# Patient Record
Sex: MALE | Race: WHITE | Employment: FULL TIME | URBAN - METROPOLITAN AREA
[De-identification: names, ages, dates, MRNs, and addresses within clinical notes are randomized per-mention and may not be internally consistent; named-entity substitution may affect disease eponyms.]

---

## 2017-06-30 ENCOUNTER — HOSPITAL ENCOUNTER (EMERGENCY)
Age: 47
Discharge: HOME | End: 2017-06-30
Attending: EMERGENCY MEDICINE
Payer: COMMERCIAL

## 2017-06-30 VITALS
RESPIRATION RATE: 18 BRPM | TEMPERATURE: 98.2 F | WEIGHT: 260 LBS | HEART RATE: 98 BPM | BODY MASS INDEX: 35.21 KG/M2 | DIASTOLIC BLOOD PRESSURE: 82 MMHG | OXYGEN SATURATION: 100 % | HEIGHT: 72 IN | SYSTOLIC BLOOD PRESSURE: 145 MMHG

## 2017-06-30 DIAGNOSIS — I82.4Y2 ACUTE DEEP VEIN THROMBOSIS (DVT) OF PROXIMAL VEIN OF LEFT LOWER EXTREMITY (HCC): Primary | ICD-10-CM

## 2017-06-30 LAB
ALBUMIN SERPL BCP-MCNC: 3.9 G/DL (ref 3.5–5)
ALBUMIN/GLOB SERPL: 1 {RATIO} (ref 1.1–2.2)
ALP SERPL-CCNC: 78 U/L (ref 45–117)
ALT SERPL-CCNC: 24 U/L (ref 12–78)
ANION GAP BLD CALC-SCNC: 8 MMOL/L (ref 5–15)
AST SERPL W P-5'-P-CCNC: 22 U/L (ref 15–37)
BASOPHILS # BLD AUTO: 0 K/UL (ref 0–0.1)
BASOPHILS # BLD: 0 % (ref 0–1)
BILIRUB SERPL-MCNC: 0.3 MG/DL (ref 0.2–1)
BUN SERPL-MCNC: 15 MG/DL (ref 6–20)
BUN/CREAT SERPL: 15 (ref 12–20)
CALCIUM SERPL-MCNC: 9 MG/DL (ref 8.5–10.1)
CHLORIDE SERPL-SCNC: 103 MMOL/L (ref 97–108)
CO2 SERPL-SCNC: 28 MMOL/L (ref 21–32)
CREAT SERPL-MCNC: 0.97 MG/DL (ref 0.7–1.3)
EOSINOPHIL # BLD: 0.4 K/UL (ref 0–0.4)
EOSINOPHIL NFR BLD: 3 % (ref 0–7)
ERYTHROCYTE [DISTWIDTH] IN BLOOD BY AUTOMATED COUNT: 12.3 % (ref 11.5–14.5)
GLOBULIN SER CALC-MCNC: 4.1 G/DL (ref 2–4)
GLUCOSE SERPL-MCNC: 94 MG/DL (ref 65–100)
HCT VFR BLD AUTO: 39.5 % (ref 36.6–50.3)
HGB BLD-MCNC: 14 G/DL (ref 12.1–17)
LYMPHOCYTES # BLD AUTO: 22 % (ref 12–49)
LYMPHOCYTES # BLD: 2.7 K/UL (ref 0.8–3.5)
MCH RBC QN AUTO: 31.2 PG (ref 26–34)
MCHC RBC AUTO-ENTMCNC: 35.4 G/DL (ref 30–36.5)
MCV RBC AUTO: 88 FL (ref 80–99)
MONOCYTES # BLD: 0.7 K/UL (ref 0–1)
MONOCYTES NFR BLD AUTO: 6 % (ref 5–13)
NEUTS SEG # BLD: 8.1 K/UL (ref 1.8–8)
NEUTS SEG NFR BLD AUTO: 69 % (ref 32–75)
PLATELET # BLD AUTO: 177 K/UL (ref 150–400)
POTASSIUM SERPL-SCNC: 4 MMOL/L (ref 3.5–5.1)
PROT SERPL-MCNC: 8 G/DL (ref 6.4–8.2)
RBC # BLD AUTO: 4.49 M/UL (ref 4.1–5.7)
SODIUM SERPL-SCNC: 139 MMOL/L (ref 136–145)
WBC # BLD AUTO: 11.9 K/UL (ref 4.1–11.1)

## 2017-06-30 PROCEDURE — 93971 EXTREMITY STUDY: CPT

## 2017-06-30 PROCEDURE — 80053 COMPREHEN METABOLIC PANEL: CPT | Performed by: EMERGENCY MEDICINE

## 2017-06-30 PROCEDURE — 99283 EMERGENCY DEPT VISIT LOW MDM: CPT

## 2017-06-30 PROCEDURE — 85025 COMPLETE CBC W/AUTO DIFF WBC: CPT | Performed by: EMERGENCY MEDICINE

## 2017-06-30 PROCEDURE — 74011250637 HC RX REV CODE- 250/637: Performed by: EMERGENCY MEDICINE

## 2017-06-30 RX ORDER — IBUPROFEN 200 MG
400 TABLET ORAL
COMMUNITY

## 2017-06-30 RX ADMIN — RIVAROXABAN 15 MG: 15 TABLET, FILM COATED ORAL at 19:10

## 2017-06-30 NOTE — ED PROVIDER NOTES
HPI Comments: 41 y.o. male with no significant past medical history who presents from work with chief complaint of leg swelling. Per pt, he has experiencing increased LLE swelling over the past three days (since 6/27/2017) with associated tightness. He states that his tightness has been significant to his posterior left patella as well as to his posterior calf. The pt rates his pain 4/10. He makes it known that he has past hx of operation to tendon to his left knee which involved placement of two pins (occured in 1991). Since, he notes that he has not received any further operations. Per pt, he reports receiving x-ray imaging of his LLE within the past five years which showed evidence of degenerative arthritis. The pt states he was seen at his [facility] today (6/30/2017) where the diameter of both his LE was taken. He reports that he was found to have a diameter of 48 cm to his left LE while having a diameter of 44 cm to his right. The pt notes he was suggested to be seen in the ED by the physician to receive an ultrasound to rule out possible DVT. Per pt, he has no hx or family hx of blood clots in the past. He denies being on any recent long distant car rides. The pt further denies fever, chills, N/V/D, CP, SOB, abd pain and urinary symptoms. There are no other acute medical concerns at this time. Social hx: Current smoker, No known ETOH use  PCP: No primary care provider on file. Note written by Navneet Borrego, as dictated by Deanne Handy MD 5:09 PM          The history is provided by the patient. No past medical history on file. No past surgical history on file. No family history on file. Social History     Social History    Marital status: N/A     Spouse name: N/A    Number of children: N/A    Years of education: N/A     Occupational History    Not on file.      Social History Main Topics    Smoking status: Not on file    Smokeless tobacco: Not on file    Alcohol use Not on file    Drug use: Not on file    Sexual activity: Not on file     Other Topics Concern    Not on file     Social History Narrative         ALLERGIES: Review of patient's allergies indicates no known allergies. Review of Systems   Constitutional: Negative for chills and fever. HENT: Negative for rhinorrhea and sore throat. Respiratory: Negative for cough and shortness of breath. Cardiovascular: Positive for leg swelling (increase LLE swelling ongoing for the past three days (since 6/27/2017)). Negative for chest pain. Gastrointestinal: Negative for abdominal pain, diarrhea, nausea and vomiting. Genitourinary: Negative for dysuria, frequency and hematuria. Musculoskeletal: Positive for myalgias (tight sensation to posterior patella and calf of LLE which is associated with LLE swelling). Negative for arthralgias. Skin: Negative for pallor and rash. Neurological: Negative for dizziness, weakness and light-headedness. All other systems reviewed and are negative. Vitals:    06/30/17 1619   BP: 145/82   Pulse: 98   Resp: 18   Temp: 98.2 °F (36.8 °C)   SpO2: 100%   Weight: 117.9 kg (260 lb)   Height: 6' (1.829 m)            Physical Exam   Const:  No acute distress, well developed, well nourished  Head:  Atraumatic, normocephalic  Eyes:  PERRL, conjunctiva normal, no scleral icterus  Neck:  Supple, trachea midline  Cardiovascular:  RRR, no murmurs, no gallops, no rubs  Resp:  No resp distress, no increased work of breathing, no wheezes, no rhonchi, no rales,  Abd:  Soft, non-tender, non-distended, no rebound, no guarding, no CVA tenderness  :  Deferred  MSK:  No pedal edema, normal ROM. ( Moderate swelling to left calf and knee).   Neuro:  Alert and oriented x3, no cranial nerve defect  Skin:  Warm, dry, intact  Psych: normal mood and affect, behavior is normal, judgement and thought content is normal    Note written by Navneet Stafford, as dictated by Candace Singh Martha Carreno MD 5:09 PM    MDM  Number of Diagnoses or Management Options  Acute deep vein thrombosis (DVT) of proximal vein of left lower extremity [facility]):      Amount and/or Complexity of Data Reviewed  Clinical lab tests: ordered and reviewed  Tests in the radiology section of CPT®: ordered and reviewed  Review and summarize past medical records: yes    Patient Progress  Patient progress: stable    ED Course     Pt. Presents to the ER with left leg swelling. Pt. Found to have a DVT. No obvious risk factors. No signs/sx of PE. I will start her on Xarelto. Pt.  To be evaluated for admission by the hospitalist.      Procedures

## 2017-06-30 NOTE — ED NOTES
Patient discharged by provider with returned verbal understanding to RN, patient ambulated out with steady gait

## 2017-06-30 NOTE — DISCHARGE INSTRUCTIONS
We hope that we have addressed all of your medical concerns. The examination and treatment you received in the Emergency Department were for an emergent problem and were not intended as complete care. It is important that you follow up with your healthcare provider(s) for ongoing care. If your symptoms worsen or do not improve as expected, and you are unable to reach your usual health care provider(s), you should return to the Emergency Department. Today's healthcare is undergoing tremendous change, and patient satisfaction surveys are one of the many tools to assess the quality of medical care. You may receive a survey from the [employer] regarding your experience in the Emergency Department. I hope that your experience has been completely positive, particularly the medical care that I provided. As such, please participate in the survey; anything less than excellent does not meet my expectations or intentions. [facility] and [address] [place] [address] participate in nationally recognized quality of care measures. If your blood pressure is greater than 120/80, as reported below, we urge that you seek medical care to address the potential of high blood pressure, commonly known as hypertension. Hypertension can be hereditary or can be caused by certain medical conditions, pain, stress, or \"white coat syndrome. \"       Please make an appointment with your health care provider(s) for follow up of your Emergency Department visit. VITALS:   Patient Vitals for the past 8 hrs:   Temp Pulse Resp BP SpO2   06/30/17 1619 98.2 °F (36.8 °C) 98 18 145/82 100 %          Thank you for allowing us to provide you with medical care today. We realize that you have many choices for your emergency care needs. Please choose us in the future for any continued health care needs. Jorge Dhillon, [address] [place], [place] [address]y [address].   Office: [phone]            Recent Results (from the past 24 hour(s))   METABOLIC PANEL, COMPREHENSIVE    Collection Time: 17  5:27 PM   Result Value Ref Range    Sodium 139 136 - 145 mmol/L    Potassium 4.0 3.5 - 5.1 mmol/L    Chloride 103 97 - 108 mmol/L    CO2 28 21 - 32 mmol/L    Anion gap 8 5 - 15 mmol/L    Glucose 94 65 - 100 mg/dL    BUN 15 6 - 20 MG/DL    Creatinine 0.97 0.70 - 1.30 MG/DL    BUN/Creatinine ratio 15 12 - 20      GFR est AA >60 >60 ml/min/1.73m2    GFR est non-AA >60 >60 ml/min/1.73m2    Calcium 9.0 8.5 - 10.1 MG/DL    Bilirubin, total 0.3 0.2 - 1.0 MG/DL    ALT (SGPT) 24 12 - 78 U/L    AST (SGOT) 22 15 - 37 U/L    Alk. phosphatase 78 45 - 117 U/L    Protein, total 8.0 6.4 - 8.2 g/dL    Albumin 3.9 3.5 - 5.0 g/dL    Globulin 4.1 (H) 2.0 - 4.0 g/dL    A-G Ratio 1.0 (L) 1.1 - 2.2     CBC WITH AUTOMATED DIFF    Collection Time: 17  5:27 PM   Result Value Ref Range    WBC 11.9 (H) 4.1 - 11.1 K/uL    RBC 4.49 4. 10 - 5.70 M/uL    HGB 14.0 12.1 - 17.0 g/dL    HCT 39.5 36.6 - 50.3 %    MCV 88.0 80.0 - 99.0 FL    MCH 31.2 26.0 - 34.0 PG    MCHC 35.4 30.0 - 36.5 g/dL    RDW 12.3 11.5 - 14.5 %    PLATELET 984 240 - 341 K/uL    NEUTROPHILS 69 32 - 75 %    LYMPHOCYTES 22 12 - 49 %    MONOCYTES 6 5 - 13 %    EOSINOPHILS 3 0 - 7 %    BASOPHILS 0 0 - 1 %    ABS. NEUTROPHILS 8.1 (H) 1.8 - 8.0 K/UL    ABS. LYMPHOCYTES 2.7 0.8 - 3.5 K/UL    ABS. MONOCYTES 0.7 0.0 - 1.0 K/UL    ABS. EOSINOPHILS 0.4 0.0 - 0.4 K/UL    ABS.  BASOPHILS 0.0 0.0 - 0.1 K/UL     Kristyn [address]  *** PRELIMINARY REPORT ***     Name: Jade Resendez  MRN: [id number]    Outpatient  : 28 May 1970  HIS Order #: [id number]  [id number] [place] [employer] Visit #: [id number]  Date: 2017     TYPE OF TEST: Peripheral Venous Testing     REASON FOR TEST  Pain in limb, Limb swelling     Left Leg:-  Deep venous thrombosis:           Yes  Proximal extent of thrombus:      Mid Femoral  Superficial venous thrombosis:    No  Deep venous insufficiency: No  Superficial venous insufficiency: No        INTERPRETATION/FINDINGS  PROCEDURE:  LEFT LOWER EXTREMITY VENOUS DUPLEX . Evaluation of lower  extremity veins with ultrasound (B-mode imaging, pulsed Doppler, color   Doppler). Includes the common femoral, deep femoral, femoral,  popliteal, posterior tibial, peroneal, and great saphenous veins. Other veins, for example the gastrocnemius and soleal veins, may also  be visualized.     FINDINGS:  In the left lower extremity, loosely attached thrombus was  noted in the mid femoral vein extending distally to include the distal   femoral, popliteal, 1 of 2 paired gastrocnemius, paired posterior  tibial, and paired peroneal veins to the mid calf. Thrombus appears  acute and occlusive in nature, whereas partial compressability was  noted in the gastrocnemius and calf veins.     CONCLUSION: Left lower extremity venous duplex positive for acute  occlusive deep vein thrombosis involving the mid femoral vein  extending distally into the mid calf. Right lower extremity is  thrombus free.     ADDITIONAL COMMENTS     I have personally reviewed the data relevant to the interpretation of  this study.     TECHNOLOGIST: Fredrick Damon  Signed: 06/30/2017 06:21 PM

## 2017-06-30 NOTE — PROGRESS NOTES
Left LE venous duplex completed. Positive results were given to Dr. Princess Lin at 2136. He verbalized understanding. Final results to follow.

## 2017-06-30 NOTE — PROCEDURES
[facility]  *** FINAL REPORT ***    Name: Janine Schroeder  MRN: [id number]    Outpatient  : 28 May 1970  HIS Order #: [id number]  [id number] [facility] Visit #: [id number]  Date: 2017    TYPE OF TEST: Peripheral Venous Testing    REASON FOR TEST  Pain in limb, Limb swelling    Left Leg:-  Deep venous thrombosis:           Yes  Proximal extent of thrombus:      Mid Femoral  Superficial venous thrombosis:    No  Deep venous insufficiency:        No  Superficial venous insufficiency: No      INTERPRETATION/FINDINGS  PROCEDURE:  LEFT LOWER EXTREMITY VENOUS DUPLEX . Evaluation of lower  extremity veins with ultrasound (B-mode imaging, pulsed Doppler, color   Doppler). Includes the common femoral, deep femoral, femoral,  popliteal, posterior tibial, peroneal, and great saphenous veins. Other veins, for example the gastrocnemius and soleal veins, may also  be visualized. FINDINGS:  In the left lower extremity, loosely attached thrombus was  noted in the mid femoral vein extending distally to include the distal   femoral, popliteal, 1 of 2 paired gastrocnemius, paired posterior  tibial, and paired peroneal veins to the mid calf. Thrombus appears  acute and occlusive in nature, whereas partial compressability was  noted in the gastrocnemius and calf veins. CONCLUSION: Left lower extremity venous duplex positive for acute  occlusive deep vein thrombosis involving the mid femoral vein  extending distally into the mid calf. Right lower extremity is  thrombus free. ADDITIONAL COMMENTS    I have personally reviewed the data relevant to the interpretation of  this  study. TECHNOLOGIST: Matt Bosch. Nelson  Signed: 2017 06:21 PM    PHYSICIAN: Jayro Jalloh.  Ewa Grimaldo MD  Signed: 2017 10:14 AM

## 2017-12-01 ENCOUNTER — HOSPITAL ENCOUNTER (OUTPATIENT)
Dept: ULTRASOUND IMAGING | Age: 47
Discharge: HOME | End: 2017-12-01

## 2017-12-01 DIAGNOSIS — I82.412 LEFT FEMORAL VEIN DVT (HCC): ICD-10-CM

## 2017-12-01 PROCEDURE — 93971 EXTREMITY STUDY: CPT

## 2024-10-01 ENCOUNTER — HOSPITAL ENCOUNTER (EMERGENCY)
Facility: HOSPITAL | Age: 54
Discharge: HOME | End: 2024-10-01
Attending: EMERGENCY MEDICINE
Payer: COMMERCIAL

## 2024-10-01 ENCOUNTER — APPOINTMENT (OUTPATIENT)
Dept: VASCULAR SURGERY | Facility: HOSPITAL | Age: 54
End: 2024-10-01
Payer: COMMERCIAL

## 2024-10-01 VITALS
RESPIRATION RATE: 16 BRPM | HEART RATE: 89 BPM | WEIGHT: 229.72 LBS | SYSTOLIC BLOOD PRESSURE: 137 MMHG | HEIGHT: 72 IN | OXYGEN SATURATION: 97 % | BODY MASS INDEX: 31.11 KG/M2 | TEMPERATURE: 98.1 F | DIASTOLIC BLOOD PRESSURE: 80 MMHG

## 2024-10-01 DIAGNOSIS — I82.411 ACUTE DEEP VEIN THROMBOSIS (DVT) OF FEMORAL VEIN OF RIGHT LOWER EXTREMITY (HCC): Primary | ICD-10-CM

## 2024-10-01 PROCEDURE — 6370000000 HC RX 637 (ALT 250 FOR IP): Performed by: PHYSICIAN ASSISTANT

## 2024-10-01 PROCEDURE — 93971 EXTREMITY STUDY: CPT

## 2024-10-01 PROCEDURE — 99284 EMERGENCY DEPT VISIT MOD MDM: CPT

## 2024-10-01 RX ORDER — ACETAMINOPHEN 500 MG
1000 TABLET ORAL 3 TIMES DAILY PRN
Qty: 180 TABLET | Refills: 0 | Status: SHIPPED | OUTPATIENT
Start: 2024-10-01

## 2024-10-01 RX ADMIN — APIXABAN 10 MG: 5 TABLET, FILM COATED ORAL at 09:20

## 2024-10-01 ASSESSMENT — PAIN DESCRIPTION - ORIENTATION: ORIENTATION: RIGHT

## 2024-10-01 ASSESSMENT — PAIN DESCRIPTION - LOCATION: LOCATION: LEG

## 2024-10-01 ASSESSMENT — PAIN SCALES - GENERAL: PAINLEVEL_OUTOF10: 2

## 2024-10-01 NOTE — ED PROVIDER NOTES
Vitals:    Vitals:    10/01/24 0802   BP: 137/80   Pulse: 89   Resp: 16   Temp: 98.1 °F (36.7 °C)   TempSrc: Oral   SpO2: 97%   Weight: 104.2 kg (229 lb 11.5 oz)   Height: 1.829 m (6')           Medical Decision Making  Maximus Moran is a 54 y.o. male with history of DVT not currently taking blood thinners presenting to ED for swelling and mild pain of right calf x 1 week.  Physical examination positive posterior calf tenderness to palpation.  No bony tenderness nor history to indicate need for x-rays.  Pain is not out of proportion.  Neurovascularly intact.  No physical findings such as rash, erythema, increased warmth, to suggest cellulitis.  Patient is afebrile.  VSS.  Will further evaluate with right lower extremity duplex ultrasound.  Ultrasound duplex positive occlusive distal femoral vein, popliteal vein, gastrocnemius vein DVT.  Patient is neurovascularly intact.  Pain is not out of proportion.  P.o. Eliquis 10 mg.  Rx Eliquis starter and continuation pack.  Patient has close communication with PCP who he will have close follow-up with. Return precautions given to and understood by patient. Clinically stable pt, NAD, VSS, d/c home.        Amount and/or Complexity of Data Reviewed  External Data Reviewed: notes.     Details: [facility] 2019 cellulitis of lower extremity, history of DVT.    Risk  OTC drugs.  Prescription drug management.            REASSESSMENT        CONSULTS:  None    PROCEDURES:  Unless otherwise noted below, none     Procedures      FINAL IMPRESSION      1. Acute deep vein thrombosis (DVT) of femoral vein of right lower extremity (HCC)          DISPOSITION/PLAN   DISPOSITION Decision To Discharge 10/01/2024 09:10:05 AM      PATIENT REFERRED TO:  [facility]  [address] [place] [id number]  [phone]  Schedule an appointment as soon as possible for a visit in 2 days        DISCHARGE MEDICATIONS:  New Prescriptions    ACETAMINOPHEN (TYLENOL) 500 MG TABLET

## 2024-10-01 NOTE — DISCHARGE INSTRUCTIONS
Your workup today is significant for an occlusive DVT within the distal femoral vein extending to the popliteal as well as gastrocnemius vein.  We will start treatment with a blood thinner which will need to be continued with your primary care provider.  Call your primary care provider today for close outpatient follow-up.  You can continue Tylenol for relief of symptoms.  Take medication as prescribed.  Return immediately if any new or worsening symptoms.  Thank you for allowing us to be a part of your care.

## 2024-10-01 NOTE — ED TRIAGE NOTES
Patient comes in with c/o right swollen calf.  Pt noticed swelling last Monday. Patient states calf is red and warm.    PMH DVT 2017 in left leg

## 2024-10-02 LAB — ECHO BSA: 2.3 M2

## 2025-01-29 ENCOUNTER — HOSPITAL ENCOUNTER (OUTPATIENT)
Facility: HOSPITAL | Age: 55
Discharge: HOME | End: 2025-02-01
Payer: COMMERCIAL

## 2025-01-29 DIAGNOSIS — F17.200 SMOKER: ICD-10-CM

## 2025-01-29 PROCEDURE — 71271 CT THORAX LUNG CANCER SCR C-: CPT

## 2025-02-03 ENCOUNTER — HOSPITAL ENCOUNTER (OUTPATIENT)
Facility: HOSPITAL | Age: 55
Discharge: HOME | End: 2025-02-06
Payer: COMMERCIAL

## 2025-02-03 ENCOUNTER — TRANSCRIBE ORDERS (OUTPATIENT)
Facility: HOSPITAL | Age: 55
End: 2025-02-03

## 2025-02-03 DIAGNOSIS — R06.2 WHEEZING: ICD-10-CM

## 2025-02-03 DIAGNOSIS — F17.200 SMOKER: Primary | ICD-10-CM

## 2025-02-03 DIAGNOSIS — F17.200 SMOKER: ICD-10-CM

## 2025-02-03 PROCEDURE — 71046 X-RAY EXAM CHEST 2 VIEWS: CPT
